# Patient Record
Sex: FEMALE | Race: WHITE | Employment: FULL TIME | URBAN - METROPOLITAN AREA
[De-identification: names, ages, dates, MRNs, and addresses within clinical notes are randomized per-mention and may not be internally consistent; named-entity substitution may affect disease eponyms.]

---

## 2017-01-25 ENCOUNTER — TELEPHONE (OUTPATIENT)
Dept: PEDIATRICS | Facility: CLINIC | Age: 72
End: 2017-01-25

## 2017-01-25 NOTE — LETTER
February 13, 2017    Gosia Henry  [address]  [place] [address]      Dear Gosia Henry,     We have tried to contact you about your health, but have been unable to reach you.  Please call us as soon as possible so we can provide you with the best care possible.  We will continue to check in with you throughout the year to complete these items of care, if you are not able to complete these items at this time.  If you would like to complete the missing items for your care, please contact us at [phone].    We recommend the following:  -schedule a MAMMOGRAM 1 in 8 women will develop invasive breast cancer during her lifetime and it is the most common non-skin cancer in American women.  EARLY detection, new treatments, and a better understanding of the disease have increased survival rates - the 5 year survival rate in the 1960s was 63% and today it is close to 90% .  Please disregard this reminder if you have had this exam elsewhere within the last year.  It would be helpful for us to have a copy of your mammogram report in our file so that we can best coordinate your care - please contact us with when your test was done so we can update your record. Please call [phone] to schedule your mammogram today.   -complete a FIT TEST a FIT test or Fecal Immunochemical Occult Blood Test is a take home stool sample kit.  It does not replace the colonoscopy for colorectal cancer screening, but it can detect hidden bleeding in the lower colon.  It does need to be repeated every year and if a positive result is obtained, you would be referred for a colonoscopy.  If you have completed either one of these tests at another facility, please have the records sent to our clinic so that we can best coordinate your care.    Sincerely,     Your Care Team at [place]

## 2017-01-25 NOTE — TELEPHONE ENCOUNTER
Panel Management Review      Patient has the following on her problem list:       IVD   ASA: Passed    Last LDL:    CHOL      133   3/14/2016  HDL       65   3/14/2016  LDL       48   3/14/2016  TRIG       98   3/14/2016   CHOLHDLRATIO      2.1   12/31/2013     Is the patient on a Statin? YES   Is the patient on Aspirin? YES                  Medications     HMG CoA Reductase Inhibitors    simvastatin (ZOCOR) 40 MG tablet    Salicylates    aspirin 81 MG EC tablet          Last three blood pressure readings:  BP Readings from Last 3 Encounters:   06/08/16 117/68   03/16/16 180/85   01/21/15 145/79        Tobacco History:     History   Smoking status     Former Smoker -- 0.50 packs/day for 20 years     Types: Cigarettes   Smokeless tobacco     Former User     Quit date: 01/15/2014         Hypertension   Last three blood pressure readings:  BP Readings from Last 3 Encounters:   06/08/16 117/68   03/16/16 180/85   01/21/15 145/79     Blood pressure: Passed    HTN Guidelines:  Age 18-59 BP range:  Less than 140/90  Age 60-85 with Diabetes:  Less than 140/90  Age 60-85 without Diabetes:  less than 150/90      Composite cancer screening  Chart review shows that this patient is due/due soon for the following Mammogram and Fecal Colorectal (FIT)  Summary:    Patient is due/failing the following:   FIT and MAMMOGRAM    Action needed:   Mammogram,fit     Type of outreach:    Sent letter.1-     Questions for provider review:    None                                                                                                                                    Wm Beasley MA       Chart routed to Care Team .

## 2017-01-25 NOTE — Clinical Note
January 25, 2017    Gosia Henry  [address]  [place] [address]    Dear Gosia    We care about your health and have reviewed your health plan. We have reviewed your medical conditions, medication list, and lab results and are making recommendations based on this review, to better manage your health.    You are in particular need of attention regarding:  - Scheduling a Breast Cancer Screening (Mammography) [phone]  - Completing a Colon Cancer Screening (FIT) - Please complete FIT test *** and mail completed test to clinic.      Here is a list of Health Maintenance topics that are due now or due soon:  Health Maintenance Due   Topic Date Due     FIT Q1 YR (NO INBASKET)  07[birth year]05/1955     DEXA Q3 YR  07/05/1974     MAMMO SCREEN Q2 YR (SYSTEM ASSIGNED)  07/05/1984     PNEUMOCOCCAL (2 of 2 - PCV13) 06/20/2013     INFLUENZA VACCINE (SYSTEM ASSIGNED)  09/01/2016     We will be calling you in the next couple of weeks to help you schedule any appointments that are needed.  Please call us at [phone] (or use [email or web address]) to address the above recommendations.     Thank you for trusting [facility] and we appreciate the opportunity to serve you.  We look forward to supporting your healthcare needs in the future.    Jamey RegardsMando

## 2017-06-01 ENCOUNTER — TELEPHONE (OUTPATIENT)
Dept: FAMILY MEDICINE | Facility: CLINIC | Age: 72
End: 2017-06-01

## 2017-06-01 NOTE — LETTER
June 1, 2017    Gosia Henry  [address]  [place] [address]    Dear Gosia    We care about your health and have reviewed your health plan. We have reviewed your medical conditions, medication list, and lab results and are making recommendations based on this review, to better manage your health.    You are in particular need of attention regarding:  - Scheduling a Breast Cancer Screening (Mammography) [phone]  - Completing a Colon Cancer Screening (FIT) - Please complete FIT test *** and mail completed test to clinic.      Here is a list of Health Maintenance topics that are due now or due soon:  Health Maintenance Due   Topic Date Due     FIT Q1 YR  07/05/1954     DEXA Q3 YR  07/05/1974     MAMMO SCREEN Q2 YR (SYSTEM ASSIGNED)  07/05/1984     PNEUMOCOCCAL (2 of 2 - PCV13) 06/20/2013     ADVANCE DIRECTIVE PLANNING Q5 YRS  02/27/2017     TOBACCO CESSATION COUNSELING Q1 YR  03/16/2017     FALL RISK ASSESSMENT  03/16/2017     BMP Q1 YR  04/20/2017     We will be calling you in the next couple of weeks to help you schedule any appointments that are needed.  Please call us at [phone] (or use [email or web address]) to address the above recommendations.     Thank you for trusting [facility] and we appreciate the opportunity to serve you.  We look forward to supporting your healthcare needs in the future.    Healthy Regards,    Dr. Dominguez

## 2017-06-01 NOTE — TELEPHONE ENCOUNTER
Panel Management Review      Patient has the following on her problem list:       IVD   ASA: FAILED    Last LDL:    Lab Results   Component Value Date    CHOL 133 03/14/2016     Lab Results   Component Value Date    HDL 65 03/14/2016     Lab Results   Component Value Date    LDL 48 03/14/2016     Lab Results   Component Value Date    TRIG 98 03/14/2016        Lab Results   Component Value Date    CHOLHDLRATIO 2.1 12/31/2013        Is the patient on a Statin? YES   Is the patient on Aspirin? NO                  Medications     HMG CoA Reductase Inhibitors    simvastatin (ZOCOR) 40 MG tablet    Salicylates    aspirin 81 MG EC tablet          Last three blood pressure readings:  BP Readings from Last 3 Encounters:   06/08/16 117/68   03/16/16 180/85   01/21/15 145/79        Tobacco History:     History   Smoking Status     Former Smoker     Packs/day: 0.50     Years: 20.00     Types: Cigarettes   Smokeless Tobacco     Former User     Quit date: 1/15/2014       Hypertension   Last three blood pressure readings:  BP Readings from Last 3 Encounters:   06/08/16 117/68   03/16/16 180/85   01/21/15 145/79     Blood pressure: Passed    HTN Guidelines:  Age 18-59 BP range:  Less than 140/90  Age 60-85 with Diabetes:  Less than 140/90  Age 60-85 without Diabetes:  less than 150/90      Composite cancer screening  Chart review shows that this patient is due/due soon for the following Mammogram and Fecal Colorectal (FIT)  Summary:    Patient is due/failing the following:   FIT and MAMMOGRAM    Action needed:   Fit,mammogram and fit     Type of outreach:    Sent letter.    Questions for provider review:    None                                                                                                                                         Chart routed to Care Team .

## 2017-06-01 NOTE — LETTER
June 19, 2017    Gosia Henry  [address]  [place] [address]      Dear Gosia Henry,     We have tried to contact you about your health, but have been unable to reach you.  Please call us as soon as possible so we can provide you with the best care possible.  We will continue to check in with you throughout the year to complete these items of care, if you are not able to complete these items at this time.  If you would like to complete the missing items for your care, please contact us at [phone].    We recommend the following:  -schedule a MAMMOGRAM 1 in 8 women will develop invasive breast cancer during her lifetime and it is the most common non-skin cancer in American women.  EARLY detection, new treatments, and a better understanding of the disease have increased survival rates - the 5 year survival rate in the 1960s was 63% and today it is close to 90% .  Please disregard this reminder if you have had this exam elsewhere within the last year.  It would be helpful for us to have a copy of your mammogram report in our file so that we can best coordinate your care - please contact us with when your test was done so we can update your record. Please call [phone] to schedule your mammogram today.   -complete a FIT TEST a FIT test or Fecal Immunochemical Occult Blood Test is a take home stool sample kit.  It does not replace the colonoscopy for colorectal cancer screening, but it can detect hidden bleeding in the lower colon.  It does need to be repeated every year and if a positive result is obtained, you would be referred for a colonoscopy.  If you have completed either one of these tests at another facility, please have the records sent to our clinic so that we can best coordinate your care.    Sincerely,     Your Care Team at [place]

## 2017-06-08 DIAGNOSIS — I10 ESSENTIAL HYPERTENSION WITH GOAL BLOOD PRESSURE LESS THAN 140/90: ICD-10-CM

## 2017-06-08 RX ORDER — HYDROCHLOROTHIAZIDE 25 MG/1
TABLET ORAL
Qty: 30 TABLET | Refills: 0 | Status: SHIPPED | OUTPATIENT
Start: 2017-06-08 | End: 2017-07-11

## 2017-06-08 NOTE — TELEPHONE ENCOUNTER
hydrochlorothiazide (HYDRODIURIL) 25 MG tablet      Last Written Prescription Date: 6/8/16  Last Fill Quantity: 90, # refills: 3  Last Office Visit with [facility], [facility] or [facility] prescribing provider: 6/8/16       Potassium   Date Value Ref Range Status   04/20/2016 5.0 3.4 - 5.3 mmol/L Final     Creatinine   Date Value Ref Range Status   04/20/2016 1.04 0.52 - 1.04 mg/dL Final     BP Readings from Last 3 Encounters:   06/08/16 117/68   03/16/16 180/85   01/21/15 145/79

## 2017-06-08 NOTE — TELEPHONE ENCOUNTER
Medication is being filled for 1 time refill only due to:  Patient needs to be seen because it has been more than one year since last visit.     Cristina Nelson RN

## 2017-07-11 DIAGNOSIS — I10 ESSENTIAL HYPERTENSION WITH GOAL BLOOD PRESSURE LESS THAN 140/90: ICD-10-CM

## 2017-07-11 NOTE — TELEPHONE ENCOUNTER
hydrochlorothiazide (HYDRODIURIL) 25 MG tablet      Last Written Prescription Date: 6/8/17  Last Fill Quantity: 30, # refills: 0  Last Office Visit with [facility], [facility] or [facility] prescribing provider: 6/8/16       Potassium   Date Value Ref Range Status   04/20/2016 5.0 3.4 - 5.3 mmol/L Final     Creatinine   Date Value Ref Range Status   04/20/2016 1.04 0.52 - 1.04 mg/dL Final     BP Readings from Last 3 Encounters:   06/08/16 117/68   03/16/16 180/85   01/21/15 145/79

## 2017-07-11 NOTE — TELEPHONE ENCOUNTER
Routing refill request to provider for review/approval because:  Clarissa given x1 and patient did not follow up, please advise.  No future appts noted at this time      Alanis Tee RN  [facility]

## 2017-07-12 RX ORDER — HYDROCHLOROTHIAZIDE 25 MG/1
TABLET ORAL
Qty: 30 TABLET | Refills: 0 | Status: SHIPPED | OUTPATIENT
Start: 2017-07-12 | End: 2017-08-11

## 2017-07-24 DIAGNOSIS — E78.5 HYPERLIPIDEMIA LDL GOAL <70: ICD-10-CM

## 2017-07-24 RX ORDER — SIMVASTATIN 40 MG
40 TABLET ORAL AT BEDTIME
Qty: 30 TABLET | Refills: 0 | Status: SHIPPED | OUTPATIENT
Start: 2017-07-24 | End: 2017-09-14

## 2017-07-24 NOTE — TELEPHONE ENCOUNTER
Medication is being filled for 1 time refill only due to:  Patient needs to be seen because due for office visit..   Ortega Masters RN

## 2017-07-24 NOTE — TELEPHONE ENCOUNTER
simvastatin (ZOCOR) 40 MG tablet     Last Written Prescription Date: 6/8/16  Last Fill Quantity: 90, # refills: 3  Last Office Visit with [facility] or [facility] prescribing provider: 6/8/16       Lab Results   Component Value Date    CHOL 133 03/14/2016     Lab Results   Component Value Date    HDL 65 03/14/2016     Lab Results   Component Value Date    LDL 48 03/14/2016     Lab Results   Component Value Date    TRIG 98 03/14/2016     Lab Results   Component Value Date    CHOLHDLRATIO 2.1 12/31/2013

## 2017-08-11 ENCOUNTER — TELEPHONE (OUTPATIENT)
Dept: FAMILY MEDICINE | Facility: CLINIC | Age: 72
End: 2017-08-11

## 2017-08-11 DIAGNOSIS — I10 ESSENTIAL HYPERTENSION WITH GOAL BLOOD PRESSURE LESS THAN 140/90: ICD-10-CM

## 2017-08-11 RX ORDER — HYDROCHLOROTHIAZIDE 25 MG/1
TABLET ORAL
Qty: 30 TABLET | Refills: 0 | Status: SHIPPED | OUTPATIENT
Start: 2017-08-11 | End: 2017-09-14

## 2017-08-11 NOTE — TELEPHONE ENCOUNTER
hydrochlorothiazide (HYDRODIURIL) 25 MG tablet      Last Written Prescription Date: 7/12/17  Last Fill Quantity: 30, # refills: 0  Last Office Visit with [facility], [facility] or [facility] prescribing provider: 6/8/16       Potassium   Date Value Ref Range Status   04/20/2016 5.0 3.4 - 5.3 mmol/L Final     Creatinine   Date Value Ref Range Status   04/20/2016 1.04 0.52 - 1.04 mg/dL Final     BP Readings from Last 3 Encounters:   06/08/16 117/68   03/16/16 180/85   01/21/15 145/79

## 2017-08-11 NOTE — TELEPHONE ENCOUNTER
Medication is being filled for 1 time refill only due to:  Patient needs to be seen because it has been more than one year since last visit.     Routed to team to reach out to patient to schedule.    Maira Dao RN  [facility]

## 2017-08-11 NOTE — TELEPHONE ENCOUNTER
Message left on home number for patient to call back TC line.  NTBS for non-fasting labs, physical and BP check with PCP.    Lety HANSEN

## 2017-08-12 ENCOUNTER — TELEPHONE (OUTPATIENT)
Dept: FAMILY MEDICINE | Facility: CLINIC | Age: 72
End: 2017-08-12

## 2017-08-12 DIAGNOSIS — I10 ESSENTIAL HYPERTENSION WITH GOAL BLOOD PRESSURE LESS THAN 140/90: ICD-10-CM

## 2017-08-14 RX ORDER — LOSARTAN POTASSIUM 100 MG/1
TABLET ORAL
Qty: 30 TABLET | Refills: 3 | Status: SHIPPED | OUTPATIENT
Start: 2017-08-14 | End: 2017-09-14

## 2017-08-14 NOTE — TELEPHONE ENCOUNTER
losartan      Last Written Prescription Date: 6/8/16  Last Fill Quantity: 90, # refills: 4    Last Office Visit with [facility], [facility] or [facility] prescribing provider:  6/8/16   Future Office Visit:        BP Readings from Last 3 Encounters:   06/08/16 117/68   03/16/16 180/85   01/21/15 145/79     Potassium   Date Value Ref Range Status   04/20/2016 5.0 3.4 - 5.3 mmol/L Final   ]  Creatinine   Date Value Ref Range Status   04/20/2016 1.04 0.52 - 1.04 mg/dL Final   ]    Medication could be filled for 1 time refill only due to:  Patient needs to be seen because it has been more than one year since last visit.   Routing refill request to provider for review/approval because:  Allergy alert.      Also routed to team to reach out to patient to schedule annual visit.    Maira Dao RN  [facility]

## 2017-08-14 NOTE — TELEPHONE ENCOUNTER
Patient returned call and left voicemail on TC line. Patient can be reached at [phone] or [phone].

## 2017-08-14 NOTE — TELEPHONE ENCOUNTER
Message left on home number for patient to call back TC line.  NTBS for BP check with PCP.    Lety HANSEN

## 2017-08-15 NOTE — TELEPHONE ENCOUNTER
Patient left voicemail on TC line. She does not get home until after 5:30PM from work. She is requesting a call to her work number at [phone].

## 2017-09-14 ENCOUNTER — OFFICE VISIT (OUTPATIENT)
Dept: FAMILY MEDICINE | Facility: CLINIC | Age: 72
End: 2017-09-14
Payer: COMMERCIAL

## 2017-09-14 VITALS
WEIGHT: 188 LBS | BODY MASS INDEX: 35.5 KG/M2 | HEIGHT: 61 IN | SYSTOLIC BLOOD PRESSURE: 115 MMHG | HEART RATE: 69 BPM | OXYGEN SATURATION: 99 % | TEMPERATURE: 98.5 F | DIASTOLIC BLOOD PRESSURE: 56 MMHG

## 2017-09-14 DIAGNOSIS — Z12.31 VISIT FOR SCREENING MAMMOGRAM: ICD-10-CM

## 2017-09-14 DIAGNOSIS — Z78.0 ASYMPTOMATIC POSTMENOPAUSAL STATUS: ICD-10-CM

## 2017-09-14 DIAGNOSIS — I10 HYPERTENSION GOAL BP (BLOOD PRESSURE) < 140/90: Primary | ICD-10-CM

## 2017-09-14 DIAGNOSIS — Z12.31 ENCOUNTER FOR SCREENING MAMMOGRAM FOR BREAST CANCER: ICD-10-CM

## 2017-09-14 DIAGNOSIS — Z12.11 SPECIAL SCREENING FOR MALIGNANT NEOPLASMS, COLON: ICD-10-CM

## 2017-09-14 DIAGNOSIS — E78.5 HYPERLIPIDEMIA LDL GOAL <70: ICD-10-CM

## 2017-09-14 DIAGNOSIS — I10 ESSENTIAL HYPERTENSION WITH GOAL BLOOD PRESSURE LESS THAN 140/90: ICD-10-CM

## 2017-09-14 DIAGNOSIS — Z23 NEED FOR VACCINATION WITH 13-POLYVALENT PNEUMOCOCCAL CONJUGATE VACCINE: ICD-10-CM

## 2017-09-14 DIAGNOSIS — F17.200 TOBACCO USE DISORDER: ICD-10-CM

## 2017-09-14 LAB
ANION GAP SERPL CALCULATED.3IONS-SCNC: 7 MMOL/L (ref 3–14)
BUN SERPL-MCNC: 22 MG/DL (ref 7–30)
CALCIUM SERPL-MCNC: 8.8 MG/DL (ref 8.5–10.1)
CHLORIDE SERPL-SCNC: 108 MMOL/L (ref 94–109)
CO2 SERPL-SCNC: 20 MMOL/L (ref 20–32)
CREAT SERPL-MCNC: 1.38 MG/DL (ref 0.52–1.04)
GFR SERPL CREATININE-BSD FRML MDRD: 37 ML/MIN/1.7M2
GLUCOSE SERPL-MCNC: 86 MG/DL (ref 70–99)
LDLC SERPL DIRECT ASSAY-MCNC: 55 MG/DL
POTASSIUM SERPL-SCNC: 4.8 MMOL/L (ref 3.4–5.3)
SODIUM SERPL-SCNC: 135 MMOL/L (ref 133–144)

## 2017-09-14 PROCEDURE — 99214 OFFICE O/P EST MOD 30 MIN: CPT | Mod: 25 | Performed by: INTERNAL MEDICINE

## 2017-09-14 PROCEDURE — 80048 BASIC METABOLIC PNL TOTAL CA: CPT | Performed by: INTERNAL MEDICINE

## 2017-09-14 PROCEDURE — 90670 PCV13 VACCINE IM: CPT | Performed by: INTERNAL MEDICINE

## 2017-09-14 PROCEDURE — 83721 ASSAY OF BLOOD LIPOPROTEIN: CPT | Performed by: INTERNAL MEDICINE

## 2017-09-14 PROCEDURE — 90471 IMMUNIZATION ADMIN: CPT | Performed by: INTERNAL MEDICINE

## 2017-09-14 PROCEDURE — 36415 COLL VENOUS BLD VENIPUNCTURE: CPT | Performed by: INTERNAL MEDICINE

## 2017-09-14 RX ORDER — HYDROCHLOROTHIAZIDE 25 MG/1
25 TABLET ORAL DAILY
Qty: 90 TABLET | Refills: 3 | Status: SHIPPED | OUTPATIENT
Start: 2017-09-14 | End: 2018-09-07

## 2017-09-14 RX ORDER — SIMVASTATIN 40 MG
40 TABLET ORAL AT BEDTIME
Qty: 90 TABLET | Refills: 3 | Status: SHIPPED | OUTPATIENT
Start: 2017-09-14 | End: 2018-09-07

## 2017-09-14 RX ORDER — LOSARTAN POTASSIUM 100 MG/1
TABLET ORAL
Qty: 90 TABLET | Refills: 3 | Status: SHIPPED | OUTPATIENT
Start: 2017-09-14 | End: 2018-09-07

## 2017-09-14 NOTE — NURSING NOTE
Screening Questionnaire for Adult Immunization    Are you sick today?   No   Do you have allergies to medications, food, a vaccine component or latex?   No   Have you ever had a serious reaction after receiving a vaccination?   No   Do you have a long-term health problem with heart disease, lung disease, asthma, kidney disease, metabolic disease (e.g. diabetes), anemia, or other blood disorder?   No   Do you have cancer, leukemia, HIV/AIDS, or any other immune system problem?   No   In the past 3 months, have you taken medications that affect  your immune system, such as prednisone, other steroids, or anticancer drugs; drugs for the treatment of rheumatoid arthritis, Crohn s disease, or psoriasis; or have you had radiation treatments?   No   Have you had a seizure, or a brain or other nervous system problem?   No   During the past year, have you received a transfusion of blood or blood     products, or been given immune (gamma) globulin or antiviral drug?   No   For women: Are you pregnant or is there a chance you could become        pregnant during the next month?   No   Have you received any vaccinations in the past 4 weeks?   No     Immunization questionnaire answers were all negative.        Per orders of Dr. Dominguez, injection of Prevnar 13 was given. Patient instructed to remain in clinic for 15 minutes afterwards, and to report any adverse reaction to me immediately.    Prior to injection verified patient identity using patient's name and date of birth.  Vaccine information supplied.     Screening performed by Gemini Beckford on 9/14/2017 at 4:23 PM.

## 2017-09-14 NOTE — NURSING NOTE
"Chief Complaint   Patient presents with     RECHECK     BP     Health Maintenance     BMP Q1 YR, FALL RISK ASSESSMENT, TOBACCO CESSATION COUNSELING Q1 YR, ADVANCE DIRECTIVE PLANNING Q5 YRS, PNEUMOCOCCAL (2 of 2 - PCV13), MAMMO SCREEN Q2 YR (SYSTEM ASSIGNED), DEXA Q3 YR, FIT Q1 YR        Initial /56 (BP Location: Right arm, Patient Position: Chair, Cuff Size: Adult Regular)  Pulse 69  Temp 98.5  F (36.9  C) (Oral)  Ht 5' 1.22\" (1.555 m)  Wt 188 lb (85.3 kg)  SpO2 99%  BMI 35.27 kg/m2 Estimated body mass index is 35.27 kg/(m^2) as calculated from the following:    Height as of this encounter: 5' 1.22\" (1.555 m).    Weight as of this encounter: 188 lb (85.3 kg).  Medication Reconciliation: complete   Gemini Beckford MA      "

## 2017-09-14 NOTE — LETTER
[facility]   [address] [place]  [address]  [phone]                                   September 18, 2017    Gosia Henry  [address]  [place] [address]        Dear Gosia,    Labs are stable.  I have reviewed this chart but not examined the patient.    Results for orders placed or performed in visit on 09/14/17   BASIC METABOLIC PANEL   Result Value Ref Range    Sodium 135 133 - 144 mmol/L    Potassium 4.8 3.4 - 5.3 mmol/L    Chloride 108 94 - 109 mmol/L    Carbon Dioxide 20 20 - 32 mmol/L    Anion Gap 7 3 - 14 mmol/L    Glucose 86 70 - 99 mg/dL    Urea Nitrogen 22 7 - 30 mg/dL    Creatinine 1.38 (H) 0.52 - 1.04 mg/dL    GFR Estimate 37 (L) >60 mL/min/1.7m2    GFR Estimate If Black 45 (L) >60 mL/min/1.7m2    Calcium 8.8 8.5 - 10.1 mg/dL   LDL cholesterol direct   Result Value Ref Range    LDL Cholesterol Direct 55 <100 mg/dL       If you have any questions please call the clinic at [phone]    Sincerely,    Román Dominguez MD  [facility]

## 2017-09-14 NOTE — MR AVS SNAPSHOT
After Visit Summary   9/14/2017    Gosia Henry    MRN: [id number]           Patient Information     Date Of Birth          1945        Visit Information        Provider Department      9/14/2017 3:40 PM Román Dominguez MD [facility]        Today's Diagnoses     Hypertension goal BP (blood pressure) < 140/90    -  1    Asymptomatic postmenopausal status        Visit for screening mammogram        Tobacco use disorder        Hyperlipidemia LDL goal <70        Need for vaccination with 13-polyvalent pneumococcal conjugate vaccine        Encounter for screening mammogram for breast cancer        Special screening for malignant neoplasms, colon           Follow-ups after your visit        Future tests that were ordered for you today     Open Future Orders        Priority Expected Expires Ordered    DEXA HIP/PELVIS/SPINE - Future Routine  9/14/2018 9/14/2017    MA SCREENING DIGITAL BILAT - Future  (s+30) Routine  9/14/2018 9/14/2017    Fecal colorectal cancer screen (FIT) Routine 10/5/2017 12/7/2017 9/14/2017            Who to contact     If you have questions or need follow up information about today's clinic visit or your schedule please contact [facility] directly at [phone].  Normal or non-critical lab and imaging results will be communicated to you by MyChart, letter or phone within 4 business days after the clinic has received the results. If you do not hear from us within 7 days, please contact the clinic through [email or web address] or phone. If you have a critical or abnormal lab result, we will notify you by phone as soon as possible.  Submit refill requests through [employer] or call your pharmacy and they will forward the refill request to us. Please allow 3 business days for your refill to be completed.          Additional Information About Your Visit        [email or web address] Information     [employer] lets you send messages to your doctor, view your test  "results, renew your prescriptions, schedule appointments and more. To sign up, go to [email or web address] . Click on \"Log in\" on the left side of the screen, which will take you to the Welcome page. Then click on \"Sign up Now\" on the right side of the page.     You will be asked to enter the access code listed below, as well as some personal information. Please follow the directions to create your username and password.     Your access code is: [email or web address]  Expires: 2017  4:14 PM     Your access code will  in 90 days. If you need help or a new code, please call your [facility] clinic or [phone].        Care EveryWhere ID     This is your Care EveryWhere ID. This could be used by other organizations to access your [facility] medical records  [id number]        Your Vitals Were     Pulse Temperature Height Pulse Oximetry BMI (Body Mass Index)       69 98.5  F (36.9  C) (Oral) 5' 1.22\" (1.555 m) 99% 35.27 kg/m2        Blood Pressure from Last 3 Encounters:   17 115/56   16 117/68   16 180/85    Weight from Last 3 Encounters:   17 188 lb (85.3 kg)   16 189 lb (85.7 kg)   16 193 lb (87.5 kg)              We Performed the Following     ADMIN: Vaccine, Initial (00073)     BASIC METABOLIC PANEL     LDL cholesterol direct     Pneumococcal vaccine 13 valent PCV13 IM (Prevnar) [51629]     TOBACCO CESSATION ORDER FOR         Primary Care Provider Office Phone # Fax #    Román Dominguez [phone]       [address] [facility] [address]        Equal Access to Services     CUAUHTEMOC ALDRIDGE : Iwona Awad, brenton roberts, sharad toure. [facility] [phone].    ATENCIÓN: Si habla español, tiene a tiwari disposición servicios gratuitos de asistencia lingüística. Llame al [phone].    We comply with applicable federal civil rights laws and [place] laws. We do not discriminate " on the basis of race, color, national origin, age, disability sex, sexual orientation or gender identity.            Thank you!     Thank you for choosing [facility]  for your care. Our goal is always to provide you with excellent care. Hearing back from our patients is one way we can continue to improve our services. Please take a few minutes to complete the written survey that you may receive in the mail after your visit with us. Thank you!             Your Updated Medication List - Protect others around you: Learn how to safely use, store and throw away your medicines at www.disposemymeds.org.          This list is accurate as of: 9/14/17  4:14 PM.  Always use your most recent med list.                   Brand Name Dispense Instructions for use Diagnosis    aspirin 81 MG EC tablet     90 tablet    Take 1 tablet (81 mg) by mouth daily    Claudication (H), Hyperlipidemia LDL goal <70       hydrochlorothiazide 25 MG tablet    HYDRODIURIL    30 tablet    TAKE ONE TABLET BY MOUTH ONE TIME DAILY    Essential hypertension with goal blood pressure less than 140/90       losartan 100 MG tablet    COZAAR    30 tablet    TAKE 1 TABLET (100 MG) BY MOUTH DAILY    Essential hypertension with goal blood pressure less than 140/90       MULTIVITAMIN & MINERAL PO      Take 1 tablet by mouth daily.        simvastatin 40 MG tablet    ZOCOR    30 tablet    Take 1 tablet (40 mg) by mouth At Bedtime ++Due for office visit++    Hyperlipidemia LDL goal <70

## 2017-11-06 ENCOUNTER — OFFICE VISIT (OUTPATIENT)
Dept: FAMILY MEDICINE | Facility: CLINIC | Age: 72
End: 2017-11-06
Payer: COMMERCIAL

## 2017-11-06 VITALS
SYSTOLIC BLOOD PRESSURE: 145 MMHG | BODY MASS INDEX: 34.89 KG/M2 | OXYGEN SATURATION: 98 % | DIASTOLIC BLOOD PRESSURE: 70 MMHG | HEART RATE: 71 BPM | WEIGHT: 186 LBS | TEMPERATURE: 98.7 F

## 2017-11-06 DIAGNOSIS — J01.01 ACUTE RECURRENT MAXILLARY SINUSITIS: Primary | ICD-10-CM

## 2017-11-06 PROCEDURE — 99213 OFFICE O/P EST LOW 20 MIN: CPT | Performed by: INTERNAL MEDICINE

## 2017-11-06 RX ORDER — CEFDINIR 300 MG/1
300 CAPSULE ORAL 2 TIMES DAILY
Qty: 20 CAPSULE | Refills: 0 | Status: SHIPPED | OUTPATIENT
Start: 2017-11-06 | End: 2018-09-07

## 2017-11-06 ASSESSMENT — PAIN SCALES - GENERAL: PAINLEVEL: NO PAIN (0)

## 2017-11-06 NOTE — PROGRESS NOTES
SUBJECTIVE:   Gosia Henry is a 72 year old female who presents to clinic today for the following health issues:      ENT Symptoms             Symptoms: cc Present Absent Comment   Fever/Chills  x     Fatigue  x     Muscle Aches  x     Eye Irritation   x    Sneezing   x    Nasal Indra/Drg  x     Sinus Pressure/Pain  x     Loss of smell   z    Dental pain   x    Sore Throat   x    Swollen Glands   x    Ear Pain/Fullness  x     Cough  x     Wheeze   x    Chest Pain   x    Shortness of breath   x    Rash   x    Other   x      Symptom duration:  10 days ago   Symptom severity:  moderate   Treatments tried:  Rest, Increase water intake   Contacts:  Son                Problem list and histories reviewed & adjusted, as indicated.  Additional history: as documented    Patient Active Problem List   Diagnosis     Advanced directives, counseling/discussion     Hypertension goal BP (blood pressure) < 140/90     Tobacco abuse     PVD (peripheral vascular disease) with claudication (H)     Hyperlipidemia LDL goal <70     Obesity     Claudication of left lower extremity (H)     Past Surgical History:   Procedure Laterality Date     APPENDECTOMY       GYN SURGERY       STENT  2013    right leg       Social History   Substance Use Topics     Smoking status: Former Smoker     Packs/day: 0.50     Years: 20.00     Types: Cigarettes     Smokeless tobacco: Former User     Quit date: 1/15/2014     Alcohol use Yes      Comment: occ     Family History   Problem Relation Age of Onset     CEREBROVASCULAR DISEASE Mother 54     DIABETES Father      Hypertension Brother      Prostate Cancer Brother      Thyroid Disease Sister          Current Outpatient Prescriptions   Medication Sig Dispense Refill     cefdinir (OMNICEF) 300 MG capsule Take 1 capsule (300 mg) by mouth 2 times daily 20 capsule 0     losartan (COZAAR) 100 MG tablet TAKE 1 TABLET (100 MG) BY MOUTH DAILY 90 tablet 3     hydrochlorothiazide (HYDRODIURIL) 25 MG tablet Take 1  tablet (25 mg) by mouth daily 90 tablet 3     simvastatin (ZOCOR) 40 MG tablet Take 1 tablet (40 mg) by mouth At Bedtime ++Due for office visit++ 90 tablet 3     aspirin 81 MG EC tablet Take 1 tablet (81 mg) by mouth daily 90 tablet 3     Multiple Vitamins-Minerals (MULTIVITAMIN & MINERAL PO) Take 1 tablet by mouth daily.       Allergies   Allergen Reactions     Lisinopril      cough     Recent Labs   Lab Test  09/14/17   1625  04/20/16   0655  03/14/16   0712  12/31/13   0659  08/28/13   0628   A1C   --    --    --    --   5.2   LDL  55   --   48  50  108   HDL   --    --   65  63  63   TRIG   --    --   98  90  141   ALT   --    --   29  23   --    CR  1.38*  1.04  1.11*  0.69  0.71   GFRESTIMATED  37*  52*  48*  84  82   GFRESTBLACK  45*  63  59*  >90  >90   POTASSIUM  4.8  5.0  4.1  4.1   --             Reviewed and updated as needed this visit by clinical staffTobacco  Allergies  Meds  Med Hx  Surg Hx  Fam Hx  Soc Hx      Reviewed and updated as needed this visit by Provider         ROS:  Constitutional, HEENT, cardiovascular, pulmonary, gi and gu systems are negative, except as otherwise noted.      OBJECTIVE:   /70 (BP Location: Left arm, Patient Position: Chair, Cuff Size: Adult Regular)  Pulse 71  Temp 98.7  F (37.1  C) (Oral)  Wt 186 lb (84.4 kg)  SpO2 98%  Breastfeeding? No  BMI 34.89 kg/m2  Body mass index is 34.89 kg/(m^2).  GENERAL: healthy, alert and no distress  NECK: no adenopathy, no asymmetry, masses, or scars and thyroid normal to palpation  RESP: lungs clear to auscultation - no rales, rhonchi or wheezes  CV: regular rate and rhythm, normal S1 S2, no S3 or S4, no murmur, click or rub, no peripheral edema and peripheral pulses strong  ABDOMEN: soft, nontender, no hepatosplenomegaly, no masses and bowel sounds normal  MS: no gross musculoskeletal defects noted, no edema    Diagnostic Test Results:  Results for orders placed or performed in visit on 09/14/17   BASIC METABOLIC  PANEL   Result Value Ref Range    Sodium 135 133 - 144 mmol/L    Potassium 4.8 3.4 - 5.3 mmol/L    Chloride 108 94 - 109 mmol/L    Carbon Dioxide 20 20 - 32 mmol/L    Anion Gap 7 3 - 14 mmol/L    Glucose 86 70 - 99 mg/dL    Urea Nitrogen 22 7 - 30 mg/dL    Creatinine 1.38 (H) 0.52 - 1.04 mg/dL    GFR Estimate 37 (L) >60 mL/min/1.7m2    GFR Estimate If Black 45 (L) >60 mL/min/1.7m2    Calcium 8.8 8.5 - 10.1 mg/dL   LDL cholesterol direct   Result Value Ref Range    LDL Cholesterol Direct 55 <100 mg/dL       ASSESSMENT/PLAN:       ICD-10-CM    1. Acute recurrent maxillary sinusitis J01.01 cefdinir (OMNICEF) 300 MG capsule           Román Dominguez MD  [facility]

## 2017-11-06 NOTE — MR AVS SNAPSHOT
"              After Visit Summary   2017    Gosia Henry    MRN: [id number]           Patient Information     Date Of Birth          1945        Visit Information        Provider Department      2017 4:40 PM Román Dominguez MD [facility]        Today's Diagnoses     Acute recurrent maxillary sinusitis    -  1       Follow-ups after your visit        Who to contact     If you have questions or need follow up information about today's clinic visit or your schedule please contact [facility] directly at [phone].  Normal or non-critical lab and imaging results will be communicated to you by [employer], letter or phone within 4 business days after the clinic has received the results. If you do not hear from us within 7 days, please contact the clinic through [employer] or phone. If you have a critical or abnormal lab result, we will notify you by phone as soon as possible.  Submit refill requests through [employer] or call your pharmacy and they will forward the refill request to us. Please allow 3 business days for your refill to be completed.          Additional Information About Your Visit        MyChart Information     [employer] lets you send messages to your doctor, view your test results, renew your prescriptions, schedule appointments and more. To sign up, go to [email or web address] . Click on \"Log in\" on the left side of the screen, which will take you to the Welcome page. Then click on \"Sign up Now\" on the right side of the page.     You will be asked to enter the access code listed below, as well as some personal information. Please follow the directions to create your username and password.     Your access code is: [email or web address]  Expires: 2017  3:14 PM     Your access code will  in 90 days. If you need help or a new code, please call your [facility] or [phone].        Care EveryWhere ID     This is your Care EveryWhere ID. This " could be used by other organizations to access your [facility] medical records  [id number]        Your Vitals Were     Pulse Temperature Pulse Oximetry Breastfeeding? BMI (Body Mass Index)       71 98.7  F (37.1  C) (Oral) 98% No 34.89 kg/m2        Blood Pressure from Last 3 Encounters:   11/06/17 145/70   09/14/17 115/56   06/08/16 117/68    Weight from Last 3 Encounters:   11/06/17 186 lb (84.4 kg)   09/14/17 188 lb (85.3 kg)   06/08/16 189 lb (85.7 kg)              Today, you had the following     No orders found for display         Today's Medication Changes          These changes are accurate as of: 11/6/17  5:07 PM.  If you have any questions, ask your nurse or doctor.               Start taking these medicines.        Dose/Directions    cefdinir 300 MG capsule   Commonly known as:  OMNICEF   Used for:  Acute recurrent maxillary sinusitis   Started by:  Román Dominguez MD        Dose:  300 mg   Take 1 capsule (300 mg) by mouth 2 times daily   Quantity:  20 capsule   Refills:  0            Where to get your medicines      These medications were sent to [facility] [address] [place] [id number]     Phone:  [phone]     cefdinir 300 MG capsule                Primary Care Provider Office Phone # Fax #    Román Dominguez [phone]       [address] [facility] [address]        Equal Access to Services     SYLVESTER ALDRIDGE AH: Iwona marieo Soana maria, waaxda luqadaha, qaybta kaalmada adeegyada, wax corwin handy ademedhat merritt. So [facility] [phone].    ATENCIÓN: Si habla español, tiene a tiwari disposición servicios gratuitos de asistencia lingüística. Llame al [phone].    We comply with applicable federal civil rights laws and [place] laws. We do not discriminate on the basis of race, color, national origin, age, disability, sex, sexual orientation, or gender identity.            Thank you!     Thank  you for choosing [facility]  for your care. Our goal is always to provide you with excellent care. Hearing back from our patients is one way we can continue to improve our services. Please take a few minutes to complete the written survey that you may receive in the mail after your visit with us. Thank you!             Your Updated Medication List - Protect others around you: Learn how to safely use, store and throw away your medicines at www.disposemymeds.org.          This list is accurate as of: 11/6/17  5:07 PM.  Always use your most recent med list.                   Brand Name Dispense Instructions for use Diagnosis    aspirin 81 MG EC tablet     90 tablet    Take 1 tablet (81 mg) by mouth daily    Claudication (H), Hyperlipidemia LDL goal <70       cefdinir 300 MG capsule    OMNICEF    20 capsule    Take 1 capsule (300 mg) by mouth 2 times daily    Acute recurrent maxillary sinusitis       hydrochlorothiazide 25 MG tablet    HYDRODIURIL    90 tablet    Take 1 tablet (25 mg) by mouth daily    Essential hypertension with goal blood pressure less than 140/90       losartan 100 MG tablet    COZAAR    90 tablet    TAKE 1 TABLET (100 MG) BY MOUTH DAILY    Essential hypertension with goal blood pressure less than 140/90       MULTIVITAMIN & MINERAL PO      Take 1 tablet by mouth daily.        simvastatin 40 MG tablet    ZOCOR    90 tablet    Take 1 tablet (40 mg) by mouth At Bedtime ++Due for office visit++    Hyperlipidemia LDL goal <70

## 2017-11-06 NOTE — NURSING NOTE
"Chief Complaint   Patient presents with     URI       Initial /70 (BP Location: Left arm, Patient Position: Chair, Cuff Size: Adult Regular)  Pulse 71  Temp 98.7  F (37.1  C) (Oral)  Wt 186 lb (84.4 kg)  SpO2 98%  Breastfeeding? No  BMI 34.89 kg/m2 Estimated body mass index is 34.89 kg/(m^2) as calculated from the following:    Height as of 9/14/17: 5' 1.22\" (1.555 m).    Weight as of this encounter: 186 lb (84.4 kg).  Medication Reconciliation: complete   Aspen Escalera MA      "

## 2018-03-13 ENCOUNTER — TELEPHONE (OUTPATIENT)
Dept: FAMILY MEDICINE | Facility: CLINIC | Age: 73
End: 2018-03-13

## 2018-03-13 DIAGNOSIS — Z12.11 SPECIAL SCREENING FOR MALIGNANT NEOPLASMS, COLON: ICD-10-CM

## 2018-03-13 DIAGNOSIS — Z12.31 VISIT FOR SCREENING MAMMOGRAM: Primary | ICD-10-CM

## 2018-03-13 NOTE — LETTER
April 10, 2018    Gosia Henry  [address]  [place] [address]      Dear Gosia Henry,     We have tried to contact you about your health, but have been unable to reach you.  Please call us as soon as possible so we can provide you with the best care possible.  We will continue to check in with you throughout the year to complete these items of care, if you are not able to complete these items at this time.  If you would like to complete the missing items for your care, please contact us at [phone].    We recommend the following:  -schedule a MAMMOGRAM 1 in 8 women will develop invasive breast cancer during her lifetime and it is the most common non-skin cancer in American women.  EARLY detection, new treatments, and a better understanding of the disease have increased survival rates - the 5 year survival rate in the 1960s was 63% and today it is close to 90% .  Please disregard this reminder if you have had this exam elsewhere within the last year.  It would be helpful for us to have a copy of your mammogram report in our file so that we can best coordinate your care - please contact us with when your test was done so we can update your record. Please call [phone] to schedule your mammogram today.   -complete a FIT TEST a FIT test or Fecal Immunochemical Occult Blood Test is a take home stool sample kit.  It does not replace the colonoscopy for colorectal cancer screening, but it can detect hidden bleeding in the lower colon.  It does need to be repeated every year and if a positive result is obtained, you would be referred for a colonoscopy.  If you have completed either one of these tests at another facility, please have the records sent to our clinic so that we can best coordinate your care.        Sincerely,     Your Care Team at [facility]  JPB/EN

## 2018-03-13 NOTE — LETTER
March 13, 2018    Gosia Henry  [address]  [place] [address]    Dear Gosia    We care about your health and have reviewed your health plan. We have reviewed your medical conditions, medication list, and lab results and are making recommendations based on this review, to better manage your health.    You are in particular need of attention regarding:  - Scheduling a Breast Cancer Screening (Mammography) [phone]  - Completing a Colon Cancer Screening (FIT) - Please complete FIT test which can be picked up at the clinic and mail completed test to clinic.      Here is a list of Health Maintenance topics that are due now or due soon:  Health Maintenance Due   Topic Date Due     FIT Q1 YR  07/05/1954     DEXA Q3 YR  07/05/1974     MAMMO SCREEN Q2 YR (SYSTEM ASSIGNED)  07/05/1994     ADVANCE DIRECTIVE PLANNING Q5 YRS  02/27/2017     We will be calling you in the next couple of weeks to help you schedule any appointments that are needed.  Please call us at [phone] (or use [facility]) to address the above recommendations.     Thank you for trusting [facility] and we appreciate the opportunity to serve you.  We look forward to supporting your healthcare needs in the future.    Healthy Regards,    Your Health Care Team  JPB/EN

## 2018-03-13 NOTE — TELEPHONE ENCOUNTER
Panel Management Review      Patient has the following on her problem list:       IVD   ASA: Passed    Last LDL:    Lab Results   Component Value Date    CHOL 133 03/14/2016     Lab Results   Component Value Date    HDL 65 03/14/2016     Lab Results   Component Value Date    LDL 55 09/14/2017    LDL 48 03/14/2016     Lab Results   Component Value Date    TRIG 98 03/14/2016        Lab Results   Component Value Date    CHOLHDLRATIO 2.1 12/31/2013        Is the patient on a Statin? YES   Is the patient on Aspirin? YES                  Medications     HMG CoA Reductase Inhibitors    simvastatin (ZOCOR) 40 MG tablet    Salicylates    aspirin 81 MG EC tablet          Last three blood pressure readings:  BP Readings from Last 3 Encounters:   11/06/17 145/70   09/14/17 115/56   06/08/16 117/68        Tobacco History:     History   Smoking Status     Former Smoker     Packs/day: 0.50     Years: 20.00     Types: Cigarettes   Smokeless Tobacco     Former User     Quit date: 1/15/2014       Hypertension   Last three blood pressure readings:  BP Readings from Last 3 Encounters:   11/06/17 145/70   09/14/17 115/56   06/08/16 117/68     Blood pressure: Passed    HTN Guidelines:  Age 18-59 BP range:  Less than 140/90  Age 60-85 with Diabetes:  Less than 140/90  Age 60-85 without Diabetes:  less than 150/90      Composite cancer screening  Chart review shows that this patient is due/due soon for the following Mammogram and Fecal Colorectal (FIT)  Summary:    Patient is due/failing the following:   FIT and MAMMOGRAM    Action needed:   Pt needs to schedule mammogram and complete Fit.    Type of outreach:    Sent letter.    Questions for provider review:    Sign order for mammogram and FIT.                                                                                                                                    Aspen Escalera MA     Chart routed to Care Team .

## 2018-04-10 NOTE — TELEPHONE ENCOUNTER
Tried for 3rd attempt,Called and left a message for pt to call the clinic back.    Unable to reach pt, sending final letter.    Aspen Escalera MA

## 2018-09-07 ENCOUNTER — OFFICE VISIT (OUTPATIENT)
Dept: FAMILY MEDICINE | Facility: CLINIC | Age: 73
End: 2018-09-07
Payer: COMMERCIAL

## 2018-09-07 VITALS
DIASTOLIC BLOOD PRESSURE: 73 MMHG | HEART RATE: 76 BPM | HEIGHT: 61 IN | SYSTOLIC BLOOD PRESSURE: 130 MMHG | WEIGHT: 181 LBS | BODY MASS INDEX: 34.17 KG/M2 | OXYGEN SATURATION: 97 % | TEMPERATURE: 98 F

## 2018-09-07 DIAGNOSIS — R53.83 FATIGUE, UNSPECIFIED TYPE: ICD-10-CM

## 2018-09-07 DIAGNOSIS — R73.01 IMPAIRED FASTING GLUCOSE: ICD-10-CM

## 2018-09-07 DIAGNOSIS — E78.5 HYPERLIPIDEMIA LDL GOAL <70: ICD-10-CM

## 2018-09-07 DIAGNOSIS — I10 ESSENTIAL HYPERTENSION WITH GOAL BLOOD PRESSURE LESS THAN 140/90: Primary | ICD-10-CM

## 2018-09-07 DIAGNOSIS — E66.9 OBESITY, UNSPECIFIED CLASSIFICATION, UNSPECIFIED OBESITY TYPE, UNSPECIFIED WHETHER SERIOUS COMORBIDITY PRESENT: ICD-10-CM

## 2018-09-07 DIAGNOSIS — M79.10 MUSCLE PAIN: ICD-10-CM

## 2018-09-07 LAB
BASOPHILS # BLD AUTO: 0.1 10E9/L (ref 0–0.2)
BASOPHILS NFR BLD AUTO: 0.6 %
DIFFERENTIAL METHOD BLD: ABNORMAL
EOSINOPHIL # BLD AUTO: 0.4 10E9/L (ref 0–0.7)
EOSINOPHIL NFR BLD AUTO: 3.7 %
ERYTHROCYTE [DISTWIDTH] IN BLOOD BY AUTOMATED COUNT: 13 % (ref 10–15)
HBA1C MFR BLD: 5.3 % (ref 0–5.6)
HCT VFR BLD AUTO: 36.3 % (ref 35–47)
HGB BLD-MCNC: 11.7 G/DL (ref 11.7–15.7)
LYMPHOCYTES # BLD AUTO: 3.2 10E9/L (ref 0.8–5.3)
LYMPHOCYTES NFR BLD AUTO: 32.9 %
MCH RBC QN AUTO: 31.7 PG (ref 26.5–33)
MCHC RBC AUTO-ENTMCNC: 32.2 G/DL (ref 31.5–36.5)
MCV RBC AUTO: 98 FL (ref 78–100)
MONOCYTES # BLD AUTO: 0.7 10E9/L (ref 0–1.3)
MONOCYTES NFR BLD AUTO: 7.1 %
NEUTROPHILS # BLD AUTO: 5.4 10E9/L (ref 1.6–8.3)
NEUTROPHILS NFR BLD AUTO: 55.7 %
PLATELET # BLD AUTO: 359 10E9/L (ref 150–450)
RBC # BLD AUTO: 3.69 10E12/L (ref 3.8–5.2)
WBC # BLD AUTO: 9.7 10E9/L (ref 4–11)

## 2018-09-07 PROCEDURE — 85025 COMPLETE CBC W/AUTO DIFF WBC: CPT | Performed by: FAMILY MEDICINE

## 2018-09-07 PROCEDURE — 82550 ASSAY OF CK (CPK): CPT | Performed by: FAMILY MEDICINE

## 2018-09-07 PROCEDURE — 36415 COLL VENOUS BLD VENIPUNCTURE: CPT | Performed by: FAMILY MEDICINE

## 2018-09-07 PROCEDURE — 84443 ASSAY THYROID STIM HORMONE: CPT | Performed by: FAMILY MEDICINE

## 2018-09-07 PROCEDURE — 99214 OFFICE O/P EST MOD 30 MIN: CPT | Performed by: FAMILY MEDICINE

## 2018-09-07 PROCEDURE — 83735 ASSAY OF MAGNESIUM: CPT | Performed by: FAMILY MEDICINE

## 2018-09-07 PROCEDURE — 80053 COMPREHEN METABOLIC PANEL: CPT | Performed by: FAMILY MEDICINE

## 2018-09-07 PROCEDURE — 83036 HEMOGLOBIN GLYCOSYLATED A1C: CPT | Performed by: FAMILY MEDICINE

## 2018-09-07 RX ORDER — LOSARTAN POTASSIUM 100 MG/1
TABLET ORAL
Qty: 90 TABLET | Refills: 3 | Status: SHIPPED | OUTPATIENT
Start: 2018-09-07 | End: 2019-08-31

## 2018-09-07 RX ORDER — HYDROCHLOROTHIAZIDE 25 MG/1
25 TABLET ORAL DAILY
Qty: 90 TABLET | Refills: 3 | Status: SHIPPED | OUTPATIENT
Start: 2018-09-07 | End: 2019-08-31

## 2018-09-07 RX ORDER — SIMVASTATIN 40 MG
40 TABLET ORAL AT BEDTIME
Qty: 90 TABLET | Refills: 3 | Status: SHIPPED | OUTPATIENT
Start: 2018-09-07 | End: 2019-12-31

## 2018-09-07 ASSESSMENT — PAIN SCALES - GENERAL: PAINLEVEL: NO PAIN (0)

## 2018-09-07 NOTE — LETTER
[facility]   [address] [place]  [address]  [phone]                                   September 12, 2018    Gosia Henry  [address]  [place] [address]        Dear Gosia,    The kidney test ( creatinine ) is higher than last time.  See one of us in about 2-3 months so we can recheck this.  Stay well hydrated.    Other labs are okay.    Results for orders placed or performed in visit on 09/07/18   Comprehensive metabolic panel   Result Value Ref Range    Sodium 134 133 - 144 mmol/L    Potassium 5.1 3.4 - 5.3 mmol/L    Chloride 107 94 - 109 mmol/L    Carbon Dioxide 15 (L) 20 - 32 mmol/L    Anion Gap 12 3 - 14 mmol/L    Glucose 88 70 - 99 mg/dL    Urea Nitrogen 38 (H) 7 - 30 mg/dL    Creatinine 1.67 (H) 0.52 - 1.04 mg/dL    GFR Estimate 30 (L) >60 mL/min/1.7m2    GFR Estimate If Black 36 (L) >60 mL/min/1.7m2    Calcium 8.8 8.5 - 10.1 mg/dL    Bilirubin Total 0.3 0.2 - 1.3 mg/dL    Albumin 3.8 3.4 - 5.0 g/dL    Protein Total 7.4 6.8 - 8.8 g/dL    Alkaline Phosphatase 167 (H) 40 - 150 U/L    ALT 24 0 - 50 U/L    AST 24 0 - 45 U/L   CBC with platelets differential   Result Value Ref Range    WBC 9.7 4.0 - 11.0 10e9/L    RBC Count 3.69 (L) 3.8 - 5.2 10e12/L    Hemoglobin 11.7 11.7 - 15.7 g/dL    Hematocrit 36.3 35.0 - 47.0 %    MCV 98 78 - 100 fl    MCH 31.7 26.5 - 33.0 pg    MCHC 32.2 31.5 - 36.5 g/dL    RDW 13.0 10.0 - 15.0 %    Platelet Count 359 150 - 450 10e9/L    Diff Method Automated Method     % Neutrophils 55.7 %    % Lymphocytes 32.9 %    % Monocytes 7.1 %    % Eosinophils 3.7 %    % Basophils 0.6 %    Absolute Neutrophil 5.4 1.6 - 8.3 10e9/L    Absolute Lymphocytes 3.2 0.8 - 5.3 10e9/L    Absolute Monocytes 0.7 0.0 - 1.3 10e9/L    Absolute Eosinophils 0.4 0.0 - 0.7 10e9/L    Absolute Basophils 0.1 0.0 - 0.2 10e9/L   TSH with free T4 reflex   Result Value Ref Range    TSH 2.69 0.40 - 4.00 mU/L   Hemoglobin A1c   Result Value Ref Range     Hemoglobin A1C 5.3 0 - 5.6 %   CK total   Result Value Ref Range    CK Total 70 30 - 225 U/L   Magnesium   Result Value Ref Range    Magnesium 2.4 (H) 1.6 - 2.3 mg/dL       If you have any questions please call the clinic at [phone]    Sincerely,    Maurizio Vences MD  bmd

## 2018-09-07 NOTE — PATIENT INSTRUCTIONS
Keep working on healthy diet/exercise     Stay on same medications    We will send you lab results      Take over the counter glucosamine pill once or twice      36.4

## 2018-09-07 NOTE — PROGRESS NOTES
SUBJECTIVE:   Gosia Henry is a 73 year old female who presents to clinic today for the following health issues:       Hypertension Follow-up      Outpatient blood pressures are not being checked.    Low Salt Diet: no added salt      Amount of exercise or physical activity: None    Problems taking medications regularly: No    Medication side effects: none    Diet: low salt and low fat/cholesterol        none    Problem list and histories reviewed & adjusted, as indicated.  Additional history: as documented         Reviewed and updated as needed this visit by clinical staff  Tobacco  Allergies  Meds  Med Hx  Surg Hx  Fam Hx  Soc Hx      Reviewed and updated as needed this visit by Provider          no chest pain/ breathing problems    Some charley horses in left leg while sleeping  Not when walking    No restless legs     Cramps up some times    Tries to get up and walk, goes away after a while    Tries to   Walk 1 1/2 miles daily    Physical Exam   Constitutional: She is oriented to person, place, and time and well-developed, well-nourished, and in no distress. No distress.   HENT:   Head: Normocephalic and atraumatic.   Neck: Carotid bruit is not present.   Cardiovascular: Normal rate, regular rhythm, normal heart sounds and intact distal pulses.  Exam reveals no gallop and no friction rub.    No murmur heard.  Pulmonary/Chest: Effort normal and breath sounds normal.   Musculoskeletal: She exhibits no edema.   Neurological: She is alert and oriented to person, place, and time.   Skin: She is not diaphoretic.   Psychiatric: Mood and affect normal.     Patient showed me her hands.  Some pain especially on right.  Th 1st cmc joint is moderately tender and appears to be the source of much of her symptoms.      ASSESSMENT / PLAN:  (I10) Essential hypertension with goal blood pressure less than 140/90  (primary encounter diagnosis)  Comment: blood pressure okay on current regimen  Plan: hydrochlorothiazide  (HYDRODIURIL) 25 MG tablet,        losartan (COZAAR) 100 MG tablet        Refill meds     (E78.5) Hyperlipidemia LDL goal <70  Comment: patient not fasting but refill meds  Plan: simvastatin (ZOCOR) 40 MG tablet, Comprehensive        metabolic panel             (R53.83) Fatigue, unspecified type  Comment: check labs   Plan: CBC with platelets differential, TSH with free         T4 reflex             (R73.01) Impaired fasting glucose  Comment: check   Plan: Hemoglobin A1c             (M79.1) Muscle pain  Comment: check labs   Plan: CK total, Magnesium         Patient may have nocturnal leg cramps.  Discussed in detail.  Try over the counter tonic water at night.     (E66.9) Obesity, unspecified classification, unspecified obesity type, unspecified whether serious comorbidity present  Comment: chronic   Plan: keep working on healthy diet/exercise and wt loss    Osteoarthritis of hands: discussed in detail.  Work on strengthening the hands.  Use glucosamine daily.  Follow up prn.       I reviewed the patient's medications, allergies, medical history, family history, and social history.    Maurizio Vences MD

## 2018-09-07 NOTE — MR AVS SNAPSHOT
"              After Visit Summary   9/7/2018    Gosia Henry    MRN: [id number]           Patient Information     Date Of Birth          1945        Visit Information        Provider Department      9/7/2018 4:20 PM Maurizio Vences MD [facility]        Today's Diagnoses     Fatigue, unspecified type    -  1    Essential hypertension with goal blood pressure less than 140/90        Hyperlipidemia LDL goal <70        Impaired fasting glucose        Muscle pain          Care Instructions    Keep working on healthy diet/exercise     Stay on same medications    We will send you lab results      Take over the counter glucosamine pill once or twice             Follow-ups after your visit        Who to contact     If you have questions or need follow up information about today's clinic visit or your schedule please contact [facility] directly at [phone].  Normal or non-critical lab and imaging results will be communicated to you by MyChart, letter or phone within 4 business days after the clinic has received the results. If you do not hear from us within 7 days, please contact the clinic through MyChart or phone. If you have a critical or abnormal lab result, we will notify you by phone as soon as possible.  Submit refill requests through InfoRemate or call your pharmacy and they will forward the refill request to us. Please allow 3 business days for your refill to be completed.          Additional Information About Your Visit        LSAT [employer] Information     InfoRemate lets you send messages to your doctor, view your test results, renew your prescriptions, schedule appointments and more. To sign up, go to [email or web address] . Click on \"Log in\" on the left side of the screen, which will take you to the Welcome page. Then click on \"Sign up Now\" on the right side of the page.     You will be asked to enter the access code listed below, as well as some personal " "information. Please follow the directions to create your username and password.     Your access code is: [email or web address]  Expires: 2018  4:54 PM     Your access code will  in 90 days. If you need help or a new code, please call your [facility] or [phone].        Care EveryWhere ID     This is your Care EveryWhere ID. This could be used by other organizations to access your [place] medical records  [id number]        Your Vitals Were     Pulse Temperature Height Pulse Oximetry Breastfeeding? BMI (Body Mass Index)    76 98  F (36.7  C) (Oral) 5' 1.22\" (1.555 m) 97% No 33.95 kg/m2       Blood Pressure from Last 3 Encounters:   18 130/73   17 145/70   17 115/56    Weight from Last 3 Encounters:   18 181 lb (82.1 kg)   17 186 lb (84.4 kg)   17 188 lb (85.3 kg)              We Performed the Following     CBC with platelets differential     CK total     Comprehensive metabolic panel     Hemoglobin A1c     Magnesium     TSH with free T4 reflex          Today's Medication Changes          These changes are accurate as of 18  4:57 PM.  If you have any questions, ask your nurse or doctor.               These medicines have changed or have updated prescriptions.        Dose/Directions    simvastatin 40 MG tablet   Commonly known as:  ZOCOR   This may have changed:  additional instructions   Used for:  Hyperlipidemia LDL goal <70   Changed by:  Maurizio Vences MD        Dose:  40 mg   Take 1 tablet (40 mg) by mouth At Bedtime   Quantity:  90 tablet   Refills:  3            Where to get your medicines      These medications were sent to [facility] [address] [place] [address]     Phone:  [phone]     hydrochlorothiazide 25 MG tablet    losartan 100 MG tablet    simvastatin 40 MG tablet                Primary Care Provider Office Phone # Fax #    Román Dominguez [phone]       [address] [facility] [address]        Equal Access to Services     CUAUHTEMOC ALDRIDGE : Hadii aad ku hadsavitalyndon Soana maria, waaxda luqadaha, qaybta scarlettgarrypati yun, sharad solisjuanisjackie lamas . So [facility] [phone].    ATENCIÓN: Si habla español, tiene a tiwari disposición servicios gratuitos de asistencia lingüística. Llame al [phone].    We comply with applicable federal civil rights laws and [place] laws. We do not discriminate on the basis of race, color, national origin, age, disability, sex, sexual orientation, or gender identity.            Thank you!     Thank you for choosing [facility]  for your care. Our goal is always to provide you with excellent care. Hearing back from our patients is one way we can continue to improve our services. Please take a few minutes to complete the written survey that you may receive in the mail after your visit with us. Thank you!             Your Updated Medication List - Protect others around you: Learn how to safely use, store and throw away your medicines at www.disposemymeds.org.          This list is accurate as of 9/7/18  4:57 PM.  Always use your most recent med list.                   Brand Name Dispense Instructions for use Diagnosis    aspirin 81 MG EC tablet     90 tablet    Take 1 tablet (81 mg) by mouth daily    Claudication (H), Hyperlipidemia LDL goal <70       hydrochlorothiazide 25 MG tablet    HYDRODIURIL    90 tablet    Take 1 tablet (25 mg) by mouth daily    Essential hypertension with goal blood pressure less than 140/90       losartan 100 MG tablet    COZAAR    90 tablet    TAKE 1 TABLET (100 MG) BY MOUTH DAILY    Essential hypertension with goal blood pressure less than 140/90       MULTIVITAMIN & MINERAL PO      Take 1 tablet by mouth daily.        simvastatin 40 MG tablet    ZOCOR    90 tablet    Take 1 tablet (40 mg) by mouth At Bedtime    Hyperlipidemia LDL goal <70

## 2018-09-10 LAB
ALBUMIN SERPL-MCNC: 3.8 G/DL (ref 3.4–5)
ALP SERPL-CCNC: 167 U/L (ref 40–150)
ALT SERPL W P-5'-P-CCNC: 24 U/L (ref 0–50)
ANION GAP SERPL CALCULATED.3IONS-SCNC: 12 MMOL/L (ref 3–14)
AST SERPL W P-5'-P-CCNC: 24 U/L (ref 0–45)
BILIRUB SERPL-MCNC: 0.3 MG/DL (ref 0.2–1.3)
BUN SERPL-MCNC: 38 MG/DL (ref 7–30)
CALCIUM SERPL-MCNC: 8.8 MG/DL (ref 8.5–10.1)
CHLORIDE SERPL-SCNC: 107 MMOL/L (ref 94–109)
CK SERPL-CCNC: 70 U/L (ref 30–225)
CO2 SERPL-SCNC: 15 MMOL/L (ref 20–32)
CREAT SERPL-MCNC: 1.67 MG/DL (ref 0.52–1.04)
GFR SERPL CREATININE-BSD FRML MDRD: 30 ML/MIN/1.7M2
GLUCOSE SERPL-MCNC: 88 MG/DL (ref 70–99)
MAGNESIUM SERPL-MCNC: 2.4 MG/DL (ref 1.6–2.3)
POTASSIUM SERPL-SCNC: 5.1 MMOL/L (ref 3.4–5.3)
PROT SERPL-MCNC: 7.4 G/DL (ref 6.8–8.8)
SODIUM SERPL-SCNC: 134 MMOL/L (ref 133–144)
TSH SERPL DL<=0.005 MIU/L-ACNC: 2.69 MU/L (ref 0.4–4)

## 2018-09-11 NOTE — PROGRESS NOTES
The kidney test ( creatinine ) is higher than last time.  See one of us in about 2-3 months so we can recheck this.  Stay well hydrated.    Other labs are okay.    Maurizio Vences MD

## 2019-01-14 ENCOUNTER — OFFICE VISIT (OUTPATIENT)
Dept: FAMILY MEDICINE | Facility: CLINIC | Age: 74
End: 2019-01-14
Payer: COMMERCIAL

## 2019-01-14 VITALS
TEMPERATURE: 97.7 F | HEIGHT: 61 IN | DIASTOLIC BLOOD PRESSURE: 63 MMHG | WEIGHT: 174 LBS | BODY MASS INDEX: 32.85 KG/M2 | SYSTOLIC BLOOD PRESSURE: 117 MMHG | OXYGEN SATURATION: 99 % | HEART RATE: 79 BPM

## 2019-01-14 DIAGNOSIS — J20.9 ACUTE BRONCHITIS, UNSPECIFIED ORGANISM: ICD-10-CM

## 2019-01-14 DIAGNOSIS — J01.10 ACUTE NON-RECURRENT FRONTAL SINUSITIS: ICD-10-CM

## 2019-01-14 DIAGNOSIS — R05.9 COUGH: Primary | ICD-10-CM

## 2019-01-14 PROCEDURE — 99213 OFFICE O/P EST LOW 20 MIN: CPT | Performed by: FAMILY MEDICINE

## 2019-01-14 RX ORDER — ALBUTEROL SULFATE 90 UG/1
2 AEROSOL, METERED RESPIRATORY (INHALATION) EVERY 6 HOURS
Qty: 1 INHALER | Refills: 0 | Status: SHIPPED | OUTPATIENT
Start: 2019-01-14 | End: 2019-12-31

## 2019-01-14 RX ORDER — AZITHROMYCIN 250 MG/1
TABLET, FILM COATED ORAL
Qty: 6 TABLET | Refills: 0 | Status: SHIPPED | OUTPATIENT
Start: 2019-01-14 | End: 2019-01-19

## 2019-01-14 RX ORDER — BENZONATATE 200 MG/1
200 CAPSULE ORAL 3 TIMES DAILY PRN
Qty: 21 CAPSULE | Refills: 0 | Status: SHIPPED | OUTPATIENT
Start: 2019-01-14 | End: 2019-01-21

## 2019-01-14 ASSESSMENT — PAIN SCALES - GENERAL: PAINLEVEL: NO PAIN (0)

## 2019-01-14 ASSESSMENT — MIFFLIN-ST. JEOR: SCORE: 1226.64

## 2019-01-14 NOTE — PROGRESS NOTES
SUBJECTIVE:                                                    Gosia Henry is a 73 year old female who presents to clinic today for the following health issues:  SUBJECTIVE:   Gosia Henry is a 73 year old female who complains of nasal congestion, runny nose, sneezing, mild sore throat, dry cough and chest congestion for over 10 days. She denies a history of productive cough, harsh cough, sweats, chills, myalgias, wheezing, shortness of breath, weakness, fatigue, dizzyness, nausea and vomiting. She denies a history of asthma. Patient does not smoke cigarettes.     OBJECTIVE:  Vitals as noted by Nurse/MA above.  Appearance: in no apparent distress.   ENT- ENT exam normal, no neck nodes or sinus tenderness.   Chest - chest clear to IPPA, no tachypnea, retractions or cyanosis and S1, S2 normal, no murmur, no gallop, rate regular.    ASSESSMENT:   (R05) Cough  (primary encounter diagnosis)  Plan: albuterol (PROAIR HFA/PROVENTIL HFA/VENTOLIN         HFA) 108 (90 Base) MCG/ACT inhaler, benzonatate        (TESSALON) 200 MG capsule    (J01.10) Acute non-recurrent frontal sinusitis  Plan: azithromycin (ZITHROMAX) 250 MG tablet  (J20.9) Acute bronchitis, unspecified organism    Plan: albuterol (PROAIR HFA/PROVENTIL HFA/VENTOLIN         HFA) 108 (90 Base) MCG/ACT inhaler, benzonatate        (TESSALON) 200 MG capsule    PLAN:  Symptomatic therapy suggested: push fluids, rest and gargle warm salt water. Call or return to clinic prn if these symptoms worsen or fail to improve as anticipated.        Acute Illness   Acute illness concerns: Congestion  Onset: A week ago    Fever: no    Chills/Sweats: no    Headache (location?): no    Sinus Pressure:no    Conjunctivitis:  no    Ear Pain: no    Rhinorrhea: YES    Congestion: YES- Chest    Sore Throat: no     Cough: YES-productive of yellow sputum    Wheeze: no    Decreased Appetite: yes    Nausea: no    Vomiting: no    Diarrhea:  no    Dysuria/Freq.: no    Fatigue/Achiness:  YES- fatigue    Sick/Strep Exposure: no     Therapies Tried and outcome: Mucinex, Not helpful      Rosa Brambila MD.

## 2019-08-31 DIAGNOSIS — I10 ESSENTIAL HYPERTENSION WITH GOAL BLOOD PRESSURE LESS THAN 140/90: ICD-10-CM

## 2019-09-03 NOTE — TELEPHONE ENCOUNTER
"Requested Prescriptions   Pending Prescriptions Disp Refills     hydrochlorothiazide (HYDRODIURIL) 25 MG tablet [Pharmacy Med Name: hydroCHLOROthiazide Oral Tablet 25 MG] 90 tablet 2     Sig: TAKE ONE TABLET BY MOUTH ONE TIME DAILY   Last Written Prescription Date:  9/7/18  Last Fill Quantity: 90,  # refills: 3   Last office visit: 1/14/2019 with prescribing provider:     Future Office Visit:        Diuretics (Including Combos) Protocol Failed - 8/31/2019  7:01 AM        Failed - Normal serum creatinine on file in past 12 months     Recent Labs   Lab Test 09/07/18  1703   CR 1.67*              Passed - Blood pressure under 140/90 in past 12 months     BP Readings from Last 3 Encounters:   01/14/19 117/63   09/07/18 130/73   11/06/17 145/70                 Passed - Recent (12 mo) or future (30 days) visit within the authorizing provider's specialty     Patient had office visit in the last 12 months or has a visit in the next 30 days with authorizing provider or within the authorizing provider's specialty.  See \"Patient Info\" tab in inbasket, or \"Choose Columns\" in Meds & Orders section of the refill encounter.              Passed - Medication is active on med list        Passed - Patient is age 18 or older        Passed - No active pregancy on record        Passed - Normal serum potassium on file in past 12 months     Recent Labs   Lab Test 09/07/18  1703   POTASSIUM 5.1                    Passed - Normal serum sodium on file in past 12 months     Recent Labs   Lab Test 09/07/18  1703                 Passed - No positive pregnancy test in past 12 months        losartan (COZAAR) 100 MG tablet [Pharmacy Med Name: Losartan Potassium Oral Tablet 100 MG] 90 tablet 2     Sig: TAKE ONE TABLET BY MOUTH ONE TIME DAILY   Last Written Prescription Date:  9/7/18  Last Fill Quantity: 90,  # refills: 3   Last office visit: 1/14/2019 with prescribing provider:     Future Office Visit:        Angiotensin-II Receptors Failed - " "8/31/2019  7:01 AM        Failed - Normal serum creatinine on file in past 12 months     Recent Labs   Lab Test 09/07/18  1703   CR 1.67*             Passed - Last blood pressure under 140/90 in past 12 months     BP Readings from Last 3 Encounters:   01/14/19 117/63   09/07/18 130/73   11/06/17 145/70                 Passed - Recent (12 mo) or future (30 days) visit within the authorizing provider's specialty     Patient had office visit in the last 12 months or has a visit in the next 30 days with authorizing provider or within the authorizing provider's specialty.  See \"Patient Info\" tab in inbasket, or \"Choose Columns\" in Meds & Orders section of the refill encounter.              Passed - Medication is active on med list        Passed - Patient is age 18 or older        Passed - No active pregnancy on record        Passed - Normal serum potassium on file in past 12 months     Recent Labs   Lab Test 09/07/18  1703   POTASSIUM 5.1                    Passed - No positive pregnancy test in past 12 months          "

## 2019-09-04 RX ORDER — HYDROCHLOROTHIAZIDE 25 MG/1
TABLET ORAL
Qty: 90 TABLET | Refills: 2 | Status: SHIPPED | OUTPATIENT
Start: 2019-09-04 | End: 2019-12-31

## 2019-09-04 RX ORDER — LOSARTAN POTASSIUM 100 MG/1
TABLET ORAL
Qty: 90 TABLET | Refills: 2 | Status: SHIPPED | OUTPATIENT
Start: 2019-09-04 | End: 2019-12-31

## 2019-09-04 NOTE — TELEPHONE ENCOUNTER
Routing refill request to provider for review/approval because:  Labs out of range .Labs not current.    Sofía Jacobs RN [facility] Triage.

## 2019-12-31 ENCOUNTER — OFFICE VISIT (OUTPATIENT)
Dept: FAMILY MEDICINE | Facility: CLINIC | Age: 74
End: 2019-12-31
Payer: COMMERCIAL

## 2019-12-31 VITALS
SYSTOLIC BLOOD PRESSURE: 136 MMHG | WEIGHT: 171.4 LBS | TEMPERATURE: 98 F | BODY MASS INDEX: 32.36 KG/M2 | HEIGHT: 61 IN | HEART RATE: 89 BPM | DIASTOLIC BLOOD PRESSURE: 78 MMHG

## 2019-12-31 DIAGNOSIS — I10 HYPERTENSION GOAL BP (BLOOD PRESSURE) < 140/90: ICD-10-CM

## 2019-12-31 DIAGNOSIS — J01.00 ACUTE NON-RECURRENT MAXILLARY SINUSITIS: ICD-10-CM

## 2019-12-31 DIAGNOSIS — Z13.820 SCREENING FOR OSTEOPOROSIS: ICD-10-CM

## 2019-12-31 DIAGNOSIS — I10 ESSENTIAL HYPERTENSION WITH GOAL BLOOD PRESSURE LESS THAN 140/90: ICD-10-CM

## 2019-12-31 DIAGNOSIS — Z00.00 ENCOUNTER FOR MEDICARE ANNUAL WELLNESS EXAM: Primary | ICD-10-CM

## 2019-12-31 DIAGNOSIS — Z12.31 ENCOUNTER FOR SCREENING MAMMOGRAM FOR BREAST CANCER: ICD-10-CM

## 2019-12-31 DIAGNOSIS — E78.5 HYPERLIPIDEMIA LDL GOAL <70: ICD-10-CM

## 2019-12-31 PROCEDURE — 99397 PER PM REEVAL EST PAT 65+ YR: CPT | Performed by: FAMILY MEDICINE

## 2019-12-31 PROCEDURE — 99213 OFFICE O/P EST LOW 20 MIN: CPT | Mod: 25 | Performed by: FAMILY MEDICINE

## 2019-12-31 RX ORDER — LOSARTAN POTASSIUM 100 MG/1
TABLET ORAL
Qty: 90 TABLET | Refills: 3 | Status: SHIPPED | OUTPATIENT
Start: 2019-12-31 | End: 2021-01-01

## 2019-12-31 RX ORDER — SIMVASTATIN 40 MG
40 TABLET ORAL AT BEDTIME
Qty: 90 TABLET | Refills: 3 | Status: SHIPPED | OUTPATIENT
Start: 2019-12-31

## 2019-12-31 RX ORDER — HYDROCHLOROTHIAZIDE 25 MG/1
25 TABLET ORAL DAILY
Qty: 90 TABLET | Refills: 3 | Status: SHIPPED | OUTPATIENT
Start: 2019-12-31 | End: 2021-01-01

## 2019-12-31 RX ORDER — AMOXICILLIN 875 MG
875 TABLET ORAL 2 TIMES DAILY
Qty: 20 TABLET | Refills: 0 | Status: SHIPPED | OUTPATIENT
Start: 2019-12-31

## 2019-12-31 ASSESSMENT — ENCOUNTER SYMPTOMS
ABDOMINAL PAIN: 0
CONSTIPATION: 0
EYE PAIN: 0
DIARRHEA: 0
CHILLS: 0
HEMATOCHEZIA: 0
DIZZINESS: 0
FEVER: 0
HEMATURIA: 0
COUGH: 1
NERVOUS/ANXIOUS: 0

## 2019-12-31 ASSESSMENT — ACTIVITIES OF DAILY LIVING (ADL): CURRENT_FUNCTION: NO ASSISTANCE NEEDED

## 2019-12-31 ASSESSMENT — MIFFLIN-ST. JEOR: SCORE: 1209.85

## 2019-12-31 NOTE — PATIENT INSTRUCTIONS
Patient Education   Personalized Prevention Plan  You are due for the preventive services outlined below.  Your care team is available to assist you in scheduling these services.  If you have already completed any of these items, please share that information with your care team to update in your medical record.  Health Maintenance Due   Topic Date Due     Osteoporosis Screening  1945     Mammogram  1945     FIT Test  07/05/1954     Zoster (Shingles) Vaccine (1 of 2) 07/05/1994     Annual Wellness Visit  08/10/2013     FALL RISK ASSESSMENT  11/06/2018     PHQ-2  01/01/2019     Flu Vaccine (1) 09/01/2019     Basic Metabolic Panel  09/07/2019

## 2019-12-31 NOTE — PROGRESS NOTES
"SUBJECTIVE:   Gosia Henry is a 74 year old female who presents for Preventive Visit.    Healthy Habits:     In general, how would you rate your overall health?  Good    Frequency of exercise:  None    Do you usually eat at least 4 servings of fruit and vegetables a day, include whole grains    & fiber and avoid regularly eating high fat or \"junk\" foods?  Yes    Taking medications regularly:  Yes    Medication side effects:  None    Ability to successfully perform activities of daily living:  No assistance needed    Home Safety:  Lack of grab bars in the bathroom    Hearing Impairment:  Difficulty following a conversation in a noisy restaurant or crowded room    In the past 6 months, have you been bothered by leaking of urine?  No    In general, how would you rate your overall mental or emotional health?  Excellent      PHQ-2 Total Score: 0    Additional concerns today:  No     Still working   She is at the surgery at [facility]   She is like a patient advocate     Do you feel safe in your environment? Yes    Have you ever done Advance Care Planning? (For example, a Health Directive, POLST, or a discussion with a medical provider or your loved ones about your wishes): No, advance care planning information given to patient to review.  Patient plans to discuss their wishes with loved ones or provider.      Does not hear well in  crowd but ok one on one     Fall risk  Fallen 2 or more times in the past year?: No  Any fall with injury in the past year?: No    Cognitive Screening   1) Repeat 3 items (Leader, Season, Table)    2) Clock draw:   NORMAL  3) 3 item recall:   Recalls 2 objects   Results: 3 items recalled: COGNITIVE IMPAIRMENT LESS LIKELY    Mini-CogTM Copyright SHA Miller. Licensed by the author for use in [facility]; reprinted with permission ([email or web address]@.[email or web address]). All rights reserved.      Do you have sleep apnea, excessive snoring or daytime drowsiness?: no    Reviewed and updated as " needed this visit by clinical staff         Reviewed and updated as needed this visit by Provider        Social History     Tobacco Use     Smoking status: Former Smoker     Packs/day: 0.50     Years: 20.00     Pack years: 10.00     Types: Cigarettes     Smokeless tobacco: Former User     Quit date: 1/15/2014   Substance Use Topics     Alcohol use: Yes     Comment: occ        Alcohol Use 12/31/2019   Prescreen: >3 drinks/day or >7 drinks/week? No   Prescreen: >3 drinks/day or >7 drinks/week? -   No flowsheet data found.          Current providers sharing in care for this patient include:   Patient Care Team:  Román Dominguez MD as PCP - General (Internal Medicine)  Rosa Brambila MD as Assigned PCP    The following health maintenance items are reviewed in Epic and correct as of today:  Health Maintenance   Topic Date Due     DEXA  1945     MAMMO SCREENING  1945     FIT  07/05/1954     ZOSTER IMMUNIZATION (1 of 2) 07/05/1994     MEDICARE ANNUAL WELLNESS VISIT  08/10/2013     FALL RISK ASSESSMENT  11/06/2018     PHQ-2  01/01/2019     INFLUENZA VACCINE (1) 09/01/2019     BMP  09/07/2019     LIPID  03/14/2021     DTAP/TDAP/TD IMMUNIZATION (2 - Td) 06/20/2022     ADVANCE CARE PLANNING  09/07/2023     PNEUMOCOCCAL IMMUNIZATION 65+ LOW/MEDIUM RISK  Completed     IPV IMMUNIZATION  Aged Out     MENINGITIS IMMUNIZATION  Aged Out     Labs reviewed in EPIC  BP Readings from Last 3 Encounters:   12/31/19 136/78   01/14/19 117/63   09/07/18 130/73    Wt Readings from Last 3 Encounters:   12/31/19 77.7 kg (171 lb 6.4 oz)   01/14/19 78.9 kg (174 lb)   09/07/18 82.1 kg (181 lb)                  Patient Active Problem List   Diagnosis     Advanced directives, counseling/discussion     Hypertension goal BP (blood pressure) < 140/90     PVD (peripheral vascular disease) with claudication (H)     Hyperlipidemia LDL goal <70     Obesity     Claudication of left lower extremity (H)     Past Surgical History:   Procedure  "Laterality Date     APPENDECTOMY       GYN SURGERY       STENT  2013    right leg       Social History     Tobacco Use     Smoking status: Former Smoker     Packs/day: 0.50     Years: 20.00     Pack years: 10.00     Types: Cigarettes     Smokeless tobacco: Former User     Quit date: 1/15/2014   Substance Use Topics     Alcohol use: Yes     Comment: occ     Family History   Problem Relation Age of Onset     Cerebrovascular Disease Mother 54     Diabetes Father      Hypertension Brother      Prostate Cancer Brother      Thyroid Disease Sister          Current Outpatient Medications   Medication Sig Dispense Refill     aspirin 81 MG EC tablet Take 1 tablet (81 mg) by mouth daily 90 tablet 3     hydrochlorothiazide (HYDRODIURIL) 25 MG tablet TAKE ONE TABLET BY MOUTH ONE TIME DAILY  90 tablet 2     losartan (COZAAR) 100 MG tablet TAKE ONE TABLET BY MOUTH ONE TIME DAILY 90 tablet 2     Multiple Vitamins-Minerals (MULTIVITAMIN & MINERAL PO) Take 1 tablet by mouth daily.       simvastatin (ZOCOR) 40 MG tablet Take 1 tablet (40 mg) by mouth At Bedtime 90 tablet 3     Allergies   Allergen Reactions     Lisinopril      cough     Needs new shingles vaccine     Review of Systems   Constitutional: Negative for chills and fever.   HENT: Positive for congestion. Negative for ear pain.    Eyes: Negative for pain.   Respiratory: Positive for cough.    Cardiovascular: Negative for chest pain.   Gastrointestinal: Negative for abdominal pain, constipation, diarrhea and hematochezia.   Genitourinary: Negative for hematuria.   Neurological: Negative for dizziness.   Psychiatric/Behavioral: The patient is not nervous/anxious.          OBJECTIVE:   /78   Pulse 89   Temp 98  F (36.7  C) (Oral)   Ht 1.549 m (5' 1\")   Wt 77.7 kg (171 lb 6.4 oz)   BMI 32.39 kg/m   Estimated body mass index is 32.88 kg/m  as calculated from the following:    Height as of 1/14/19: 1.549 m (5' 1\").    Weight as of 1/14/19: 78.9 kg (174 lb).     Wt " Readings from Last 4 Encounters:   12/31/19 77.7 kg (171 lb 6.4 oz)   01/14/19 78.9 kg (174 lb)   09/07/18 82.1 kg (181 lb)   11/06/17 84.4 kg (186 lb)       Physical Exam  GENERAL: healthy, alert and no distress  EYES: Eyes grossly normal to inspection, PERRL and conjunctivae and sclerae normal  HENT: ear canals and TM's normal, nose and mouth without ulcers or lesions  NECK: no adenopathy, no asymmetry, masses, or scars and thyroid normal to palpation  RESP: lungs clear to auscultation - no rales, rhonchi or wheezes  BREAST: normal without masses, tenderness or nipple discharge and no palpable axillary masses or adenopathy  CV: regular rate and rhythm, normal S1 S2, no S3 or S4, no murmur, click or rub, no peripheral edema and peripheral pulses strong  ABDOMEN: soft, nontender, no hepatosplenomegaly, no masses and bowel sounds normal  MS: no gross musculoskeletal defects noted, no edema  SKIN: no suspicious lesions or rashes  NEURO: Normal strength and tone, mentation intact and speech normal  PSYCH: mentation appears normal, affect normal/bright        ASSESSMENT / PLAN:       ICD-10-CM    1. Encounter for Medicare annual wellness exam Z00.00    2. Acute non-recurrent maxillary sinusitis J01.00 amoxicillin (AMOXIL) 875 MG tablet   3. Encounter for screening mammogram for breast cancer Z12.31 MA SCREENING DIGITAL BILAT - Future  (s+30)     *MA Screening Digital Bilateral   4. Hypertension goal BP (blood pressure) < 140/90 I10 BASIC METABOLIC PANEL     Lipid panel reflex to direct LDL Fasting   5. Hyperlipidemia LDL goal <70 E78.5 Lipid panel reflex to direct LDL Fasting     simvastatin (ZOCOR) 40 MG tablet   6. Essential hypertension with goal blood pressure less than 140/90 I10 hydrochlorothiazide (HYDRODIURIL) 25 MG tablet     losartan (COZAAR) 100 MG tablet   7. Screening for osteoporosis Z13.820 DEXA HIP/PELVIS/SPINE - Future     Patient continues to be social   She is working       COUNSELING:  Reviewed  "preventive health counseling, as reflected in patient instructions       Regular exercise       Healthy diet/nutrition       Vision screening       Dental care       Fall risk prevention       Advanced Planning     Estimated body mass index is 32.88 kg/m  as calculated from the following:    Height as of 1/14/19: 1.549 m (5' 1\").    Weight as of 1/14/19: 78.9 kg (174 lb).    Weight management plan: Discussed healthy diet and exercise guidelines     reports that she has quit smoking. Her smoking use included cigarettes. She has a 10.00 pack-year smoking history. She quit smokeless tobacco use about 5 years ago.      Appropriate preventive services were discussed with this patient, including applicable screening as appropriate for cardiovascular disease, diabetes, osteopenia/osteoporosis, and glaucoma.  As appropriate for age/gender, discussed screening for colorectal cancer, prostate cancer, breast cancer, and cervical cancer. Checklist reviewing preventive services available has been given to the patient.    Reviewed patients plan of care and provided an AVS. The Basic Care Plan (routine screening as documented in Health Maintenance) for Gosia meets the Care Plan requirement. This Care Plan has been established and reviewed with the Patient.    Counseling Resources:  ATP IV Guidelines  Pooled Cohorts Equation Calculator  Breast Cancer Risk Calculator  FRAX Risk Assessment  ICSI Preventive Guidelines  Dietary Guidelines for Americans, 2010  USDA's MyPlate  ASA Prophylaxis  Lung CA Screening    Gerry Martin MD  [facility]    Identified Health Risks:  "

## 2021-01-01 ENCOUNTER — IMMUNIZATION (OUTPATIENT)
Dept: PEDIATRICS | Facility: CLINIC | Age: 76
End: 2021-01-01
Payer: COMMERCIAL

## 2021-01-01 ENCOUNTER — TELEPHONE (OUTPATIENT)
Dept: FAMILY MEDICINE | Facility: CLINIC | Age: 76
End: 2021-01-01

## 2021-01-01 ENCOUNTER — HEALTH MAINTENANCE LETTER (OUTPATIENT)
Age: 76
End: 2021-01-01

## 2021-01-01 ENCOUNTER — IMMUNIZATION (OUTPATIENT)
Dept: PEDIATRICS | Facility: CLINIC | Age: 76
End: 2021-01-01
Attending: INTERNAL MEDICINE
Payer: COMMERCIAL

## 2021-01-01 DIAGNOSIS — I10 ESSENTIAL HYPERTENSION WITH GOAL BLOOD PRESSURE LESS THAN 140/90: ICD-10-CM

## 2021-01-01 PROCEDURE — 91300 PR COVID VAC PFIZER DIL RECON 30 MCG/0.3 ML IM: CPT

## 2021-01-01 PROCEDURE — 0001A PR COVID VAC PFIZER DIL RECON 30 MCG/0.3 ML IM: CPT

## 2021-01-01 PROCEDURE — 0002A PR COVID VAC PFIZER DIL RECON 30 MCG/0.3 ML IM: CPT

## 2021-01-01 RX ORDER — HYDROCHLOROTHIAZIDE 25 MG/1
TABLET ORAL
Qty: 90 TABLET | Refills: 0 | Status: SHIPPED | OUTPATIENT
Start: 2021-01-01

## 2021-01-01 RX ORDER — LOSARTAN POTASSIUM 100 MG/1
TABLET ORAL
Qty: 90 TABLET | Refills: 0 | Status: SHIPPED | OUTPATIENT
Start: 2021-01-01

## 2021-03-03 NOTE — TELEPHONE ENCOUNTER
Okayed refills but patient needs to be seen in clinic in next 1-2 months.  Has seen various providers.  Angelica listed as primary but last saw Dr. OLGUIN ( now retired ).    Please inform patient.    Maurizio Vences MD

## 2021-03-03 NOTE — TELEPHONE ENCOUNTER
Needs Wellness exam.  Please schedule.    Routing refill request to provider for review/approval because:  Patient needs to be seen because it has been more than 1 year since last office visit.

## 2021-03-09 NOTE — TELEPHONE ENCOUNTER
Called and spoke with pt about message below. Pt will call back and schedule an appointment.    Lisa PARRA MA

## 2021-04-27 NOTE — TELEPHONE ENCOUNTER
Received call from Shira death investigator with [facility]. She stated that patient is now  and she requested the following information which was provided by writer:    1) When did patient last see Dr. Vences? Last OV 2018  2) Problem list review  3) Medication list review    She stated that the office will be sending death certificate for provider to fill out. She also noted that she did not see anything unusual so no autopsy will be completed. Routing to Dr. Vences as BRONSON.    Inga Adams, BSN RN  [facility]